# Patient Record
Sex: FEMALE | Race: BLACK OR AFRICAN AMERICAN | Employment: FULL TIME | URBAN - METROPOLITAN AREA
[De-identification: names, ages, dates, MRNs, and addresses within clinical notes are randomized per-mention and may not be internally consistent; named-entity substitution may affect disease eponyms.]

---

## 2020-07-13 ENCOUNTER — HOSPITAL ENCOUNTER (EMERGENCY)
Age: 35
Discharge: HOME | End: 2020-07-13
Attending: EMERGENCY MEDICINE | Admitting: EMERGENCY MEDICINE
Payer: MEDICAID

## 2020-07-13 VITALS
DIASTOLIC BLOOD PRESSURE: 83 MMHG | BODY MASS INDEX: 36.8 KG/M2 | WEIGHT: 200 LBS | SYSTOLIC BLOOD PRESSURE: 122 MMHG | HEART RATE: 91 BPM | OXYGEN SATURATION: 99 % | RESPIRATION RATE: 16 BRPM | TEMPERATURE: 98.1 F | HEIGHT: 62 IN

## 2020-07-13 DIAGNOSIS — L02.91 ABSCESS: Primary | ICD-10-CM

## 2020-07-13 PROCEDURE — 75810000289 HC I&D ABSCESS SIMP/COMP/MULT

## 2020-07-13 PROCEDURE — 99282 EMERGENCY DEPT VISIT SF MDM: CPT

## 2020-07-13 RX ORDER — HYDROCODONE BITARTRATE AND ACETAMINOPHEN 5; 325 MG/1; MG/1
1 TABLET ORAL
Qty: 15 TAB | Refills: 0 | Status: SHIPPED | OUTPATIENT
Start: 2020-07-13 | End: 2020-07-13 | Stop reason: CLARIF

## 2020-07-13 RX ORDER — SULFAMETHOXAZOLE AND TRIMETHOPRIM 800; 160 MG/1; MG/1
1 TABLET ORAL 2 TIMES DAILY
Qty: 20 TAB | Refills: 0 | Status: SHIPPED | OUTPATIENT
Start: 2020-07-13 | End: 2020-07-23

## 2020-07-13 RX ORDER — HYDROCODONE BITARTRATE AND ACETAMINOPHEN 5; 325 MG/1; MG/1
1 TABLET ORAL
Qty: 15 TAB | Refills: 0 | Status: SHIPPED | OUTPATIENT
Start: 2020-07-13 | End: 2020-07-16

## 2020-07-13 RX ORDER — SULFAMETHOXAZOLE AND TRIMETHOPRIM 800; 160 MG/1; MG/1
1 TABLET ORAL 2 TIMES DAILY
Qty: 20 TAB | Refills: 0 | Status: SHIPPED | OUTPATIENT
Start: 2020-07-13 | End: 2020-07-13 | Stop reason: SDUPTHER

## 2020-07-13 NOTE — ED PROVIDER NOTES
[facility]  Emergency Department Treatment Report        7:36 PM Melissa Baron is a 37 y.o. female who presents to ED with abscess behind her left ear. Patient states is been there for several years and it was hard and nonpainful. Patient states that recently it has grown a little bit and become painful. No fever has been reported no drainage. No other complaints, associated symptoms or modifying factors at this time. PCP: None      The history is provided by the patient. Abscess   This is a chronic problem. Episode onset: Many years. The problem occurs constantly. The problem has been gradually worsening. Pertinent negatives include no chest pain, no abdominal pain, no headaches and no shortness of breath. Nothing aggravates the symptoms. Nothing relieves the symptoms. She has tried nothing for the symptoms. History reviewed. No pertinent past medical history. History reviewed. No pertinent surgical history. History reviewed. No pertinent family history. Social History     Socioeconomic History    Marital status: SINGLE     Spouse name: Not on file    Number of children: Not on file    Years of education: Not on file    Highest education level: Not on file   Occupational History    Not on file   Social Needs    Financial resource strain: Not on file    Food insecurity     Worry: Not on file     Inability: Not on file    Transportation needs     Medical: Not on file     Non-medical: Not on file   Tobacco Use    Smoking status: Current Every Day Smoker     Packs/day: 1.00   Substance and Sexual Activity    Alcohol use:  Yes    Drug use: Never    Sexual activity: Not on file   Lifestyle    Physical activity     Days per week: Not on file     Minutes per session: Not on file    Stress: Not on file   Relationships    Social connections     Talks on phone: Not on file     Gets together: Not on file     Attends Jain service: Not on file     Active member of club or organization: Not on file     Attends meetings of clubs or organizations: Not on file     Relationship status: Not on file    Intimate partner violence     Fear of current or ex partner: Not on file     Emotionally abused: Not on file     Physically abused: Not on file     Forced sexual activity: Not on file   Other Topics Concern    Not on file   Social History Narrative    Not on file         ALLERGIES: Patient has no known allergies. Review of Systems   Constitutional: Negative for fever. Respiratory: Negative for chest tightness and shortness of breath. Cardiovascular: Negative for chest pain. Gastrointestinal: Negative for abdominal pain. Skin: Negative for color change and wound. Neurological: Negative for headaches. All other systems reviewed and are negative. Vitals:    07/13/20 1755   BP: 122/83   Pulse: 91   Resp: 16   Temp: 98.1 °F (36.7 °C)   SpO2: 99%   Weight: 90.7 kg (200 lb)   Height: 5' 2\" (1.575 m)            Physical Exam  Vitals signs and nursing note reviewed. Constitutional:       Appearance: She is well-developed. HENT:      Head: Normocephalic and atraumatic. Eyes:      Conjunctiva/sclera: Conjunctivae normal.      Pupils: Pupils are equal, round, and reactive to light. Neck:      Musculoskeletal: Normal range of motion and neck supple. Cardiovascular:      Rate and Rhythm: Normal rate and regular rhythm. Pulmonary:      Effort: Pulmonary effort is normal.      Breath sounds: Normal breath sounds. Abdominal:      General: Bowel sounds are normal.      Palpations: Abdomen is soft. Musculoskeletal: Normal range of motion. Skin:     General: Skin is warm and dry. Comments: +1.5 cm fluctuant abscess to her scalp behind her left ear. No sign of cellulitis + tenderness. Neurological:      Mental Status: She is alert and oriented to person, place, and time. Deep Tendon Reflexes: Reflexes are normal and symmetric.    Psychiatric: Behavior: Behavior normal.         Thought Content: Thought content normal.         Judgment: Judgment normal.          MDM  Number of Diagnoses or Management Options  Abscess:   Diagnosis management comments: Plan an I&D and started on antibiotics Bactrim. I&D resulted and creamy purulent and waxy discharge. Area cleansed well I and packing placed dressing placed patient instructed in wound care and return in 3 days I will provide a surgical referral for her to follow-up. She will be placed on Bactrim and Norco for pain. Patient is improved at discharge no other acute illnesses suspected patient discharged home in no distress. Amount and/or Complexity of Data Reviewed  Review and summarize past medical records: yes  Independent visualization of images, tracings, or specimens: yes    Risk of Complications, Morbidity, and/or Mortality  Presenting problems: low  Diagnostic procedures: low  Management options: low    Patient Progress  Patient progress: improved         I&D Abcess Simple    Date/Time: 7/13/2020 8:30 PM  Performed by: Akin Head NP  Authorized by: Akin Head NP     Consent:     Consent obtained:  Verbal    Consent given by:  Patient    Risks discussed:  Bleeding, incomplete drainage, pain, damage to other organs and infection    Alternatives discussed:  No treatment  Location:     Type:  Abscess    Size:  1.5    Location:  Head    Head location:  Scalp  Pre-procedure details:     Skin preparation:  Betadine  Anesthesia (see MAR for exact dosages): Anesthesia method:  Local infiltration    Local anesthetic:  Lidocaine 1% w/o epi  Procedure type:     Complexity:  Simple  Procedure details:     Needle aspiration: no      Incision types:  Single straight    Incision depth:  Dermal    Scalpel blade:  11    Wound management:  Probed and deloculated, irrigated with saline and extensive cleaning    Drainage:  Bloody and purulent    Drainage amount:   Moderate    Wound treatment:  Drain placed    Packing materials:  1/2 in iodoform gauze    Amount 1/2\" iodoform:  1\"  Post-procedure details:     Patient tolerance of procedure: Tolerated well, no immediate complications                Vitals:  Patient Vitals for the past 12 hrs:   Temp Pulse Resp BP SpO2   07/13/20 1755 98.1 °F (36.7 °C) 91 16 122/83 99 %       Medications ordered:   Medications - No data to display        Disposition:    Diagnosis:   1. Abscess        Disposition: to Home      Follow-up Information     Follow up With Specialties Details Why [address]  In 3 days  [phone] [id number]  [phone]    [facility] EMERGENCY DEPT Emergency Medicine In 3 days For wound re-check [address] [facility]    Karen Blanc MD General Surgery In 1 day  [address]             Discharge Medication List as of 7/13/2020  8:17 PM      START taking these medications    Details   HYDROcodone-acetaminophen (Norco) 5-325 mg per tablet Take 1 Tab by mouth every four (4) hours as needed for Pain for up to 3 days. Max Daily Amount: 6 Tabs., Normal, Disp-15 Tab,R-0         CONTINUE these medications which have CHANGED    Details   trimethoprim-sulfamethoxazole (Bactrim DS) 160-800 mg per tablet Take 1 Tab by mouth two (2) times a day for 10 days. , Normal, Disp-20 Tab,R-0             Return to the ER if you are unable to obtain referral as directed. Gregorio Delaney  results have been reviewed with her. She has been counseled regarding her diagnosis, treatment, and plan. She verbally conveys understanding and agreement of the signs, symptoms, diagnosis, treatment and prognosis and additionally agrees to follow up as discussed. She also agrees with the care-plan and conveys that all of her questions have been answered.   I have also provided discharge instructions for her that include: educational information regarding their diagnosis and treatment, and list of reasons why they would want to return to the ED prior to their follow-up appointment, should her condition change. Irina Smith ENP-C,FNP-C      Dragon voice recognition was used to generate this report, which may have resulted in some phonetic based errors in grammar and contents.  Even though attempts were made to correct all the mistakes, some may have been missed, and remained in the body of the document

## 2020-07-16 ENCOUNTER — OFFICE VISIT (OUTPATIENT)
Dept: SURGERY | Age: 35
End: 2020-07-16

## 2020-07-16 VITALS
SYSTOLIC BLOOD PRESSURE: 116 MMHG | HEIGHT: 62 IN | DIASTOLIC BLOOD PRESSURE: 68 MMHG | BODY MASS INDEX: 36.25 KG/M2 | WEIGHT: 197 LBS | TEMPERATURE: 98 F | RESPIRATION RATE: 18 BRPM | OXYGEN SATURATION: 100 % | HEART RATE: 92 BPM

## 2020-07-16 DIAGNOSIS — L72.3 SEBACEOUS CYST: Primary | ICD-10-CM

## 2020-07-16 NOTE — PROGRESS NOTES
Chief Complaint   Patient presents with    Skin Problem     patient referred by ED  for hard abscess behind left ear. Pt has scabbed area behind left ear. States they squeezed it at ER but thinks it is still there and would like it removed.

## 2020-07-16 NOTE — PROGRESS NOTES
General Surgery Consult    Denzel Ly  Admit date: (Not on file)    MRN: [id number]     : 1985     Age: 37 y.o. Attending Physician: Barbara Garsia MD, [facility]      History of Present Illness:      Denzel Ly is a 37 y.o. female who was referred to me from the emergency room for evaluation of an infected sebaceous cyst behind the left ear. The patient has stated that she had this cyst for many years possibly a decade however few days ago it became infected causing severe ear and neck pain. She went to the emergency room 3 days ago where they did incision and drainage and they placed her on p.o. antibiotics. She stated that she is feeling great and she has 0 pain and 0 drainage however she wanted to check to make sure that they drained everything and that the cyst will not come back. She denies any fever or chills or any pain. There are no active problems to display for this patient. No past medical history on file. No past surgical history on file. Social History     Tobacco Use    Smoking status: Current Every Day Smoker     Packs/day: 1.00   Substance Use Topics    Alcohol use: Yes      Social History     Tobacco Use   Smoking Status Current Every Day Smoker    Packs/day: 1.00     No family history on file. Current Outpatient Medications   Medication Sig    HYDROcodone-acetaminophen (Norco) 5-325 mg per tablet Take 1 Tab by mouth every four (4) hours as needed for Pain for up to 3 days. Max Daily Amount: 6 Tabs.  trimethoprim-sulfamethoxazole (Bactrim DS) 160-800 mg per tablet Take 1 Tab by mouth two (2) times a day for 10 days. No current facility-administered medications for this visit. No Known Allergies       Review of Systems:  Pertinent items are noted in the History of Present Illness.     Objective:     Visit Vitals  /68   Pulse 92   Temp 98 °F (36.7 °C) (Oral)   Resp 18   Ht 5' 2\" (1.575 m)   Wt 89.4 kg (197 lb)   LMP 2020 (Exact Date)   SpO2 100%   BMI 36.03 kg/m²       Physical Exam:      General:  in no apparent distress, alert and oriented times 3   Left neck/scalp: There is a 1 cm round sebaceous cyst located just posterior to the left ear. It is soft and nontender and partially movable with mild skin attachment. There is no tenderness or erythema. There is no drainage. There is no even an open wound. Imaging and Lab Review:     CBC: No results found for: WBC, RBC, HGB, HCT, PLT, HGBEXT, HCTEXT, PLTEXT  BMP: No results found for: GLU, NA, K, CL, CO2, BUN, CREA, CA  CMP:No results found for: GLU, NA, K, CL, CO2, BUN, CREA, CA, AGAP, BUCR, TBIL, AP, TP, ALB, GLOB, AGRAT    No results found for this or any previous visit (from the past 24 hour(s)). images and reports reviewed    Assessment:   Sabrina Seals is a 37 y.o. female is presenting with sebaceous cyst behind the left ear. I explained to the patient that it is impossible to drain it out completely and if she wants it to be completely out it need to be excised. Because the patient now is feeling great and she has no evidence of infection I encouraged her to finish the p.o. antibiotic course for now and then follow-up with me in 1 to 2 weeks if she would like to have it excised. I explained to her that she can observe it versus surgical excision and I gave her the option of doing it in the office under local anesthesia or under general anesthesia. But again for now I think this she should finish the course of antibiotics and then after she makes her decision on the surgery to call back or follow-up with me.      Plan:     Continue with the p.o. antibiotics  Follow-up with me depending on her decision about the surgery    Please call me if you have any questions (cell phone: [phone])     Signed By: Sol Gomez MD     July 16, 2020